# Patient Record
Sex: FEMALE | Race: WHITE | NOT HISPANIC OR LATINO | URBAN - METROPOLITAN AREA
[De-identification: names, ages, dates, MRNs, and addresses within clinical notes are randomized per-mention and may not be internally consistent; named-entity substitution may affect disease eponyms.]

---

## 2020-05-04 ENCOUNTER — EMERGENCY (EMERGENCY)
Facility: HOSPITAL | Age: 20
LOS: 0 days | Discharge: HOME | End: 2020-05-04
Attending: EMERGENCY MEDICINE | Admitting: PEDIATRICS
Payer: COMMERCIAL

## 2020-05-04 VITALS
TEMPERATURE: 98 F | RESPIRATION RATE: 18 BRPM | HEART RATE: 103 BPM | DIASTOLIC BLOOD PRESSURE: 81 MMHG | OXYGEN SATURATION: 100 % | WEIGHT: 152.12 LBS | SYSTOLIC BLOOD PRESSURE: 150 MMHG

## 2020-05-04 DIAGNOSIS — L05.91 PILONIDAL CYST WITHOUT ABSCESS: ICD-10-CM

## 2020-05-04 DIAGNOSIS — K42.9 UMBILICAL HERNIA WITHOUT OBSTRUCTION OR GANGRENE: ICD-10-CM

## 2020-05-04 DIAGNOSIS — R10.9 UNSPECIFIED ABDOMINAL PAIN: ICD-10-CM

## 2020-05-04 LAB
ALBUMIN SERPL ELPH-MCNC: 4.5 G/DL — SIGNIFICANT CHANGE UP (ref 3.5–5.2)
ALP SERPL-CCNC: 32 U/L — SIGNIFICANT CHANGE UP (ref 30–115)
ALT FLD-CCNC: 9 U/L — LOW (ref 14–37)
ANION GAP SERPL CALC-SCNC: 14 MMOL/L — SIGNIFICANT CHANGE UP (ref 7–14)
APPEARANCE UR: CLEAR — SIGNIFICANT CHANGE UP
AST SERPL-CCNC: 19 U/L — SIGNIFICANT CHANGE UP (ref 14–37)
BASOPHILS # BLD AUTO: 0.02 K/UL — SIGNIFICANT CHANGE UP (ref 0–0.2)
BASOPHILS NFR BLD AUTO: 0.2 % — SIGNIFICANT CHANGE UP (ref 0–1)
BILIRUB SERPL-MCNC: 1.4 MG/DL — HIGH (ref 0.2–1.2)
BILIRUB UR-MCNC: NEGATIVE — SIGNIFICANT CHANGE UP
BUN SERPL-MCNC: 10 MG/DL — SIGNIFICANT CHANGE UP (ref 10–20)
CALCIUM SERPL-MCNC: 9.3 MG/DL — SIGNIFICANT CHANGE UP (ref 8.5–10.1)
CHLORIDE SERPL-SCNC: 102 MMOL/L — SIGNIFICANT CHANGE UP (ref 98–110)
CO2 SERPL-SCNC: 23 MMOL/L — SIGNIFICANT CHANGE UP (ref 17–32)
COLOR SPEC: COLORLESS — SIGNIFICANT CHANGE UP
CREAT SERPL-MCNC: 0.7 MG/DL — SIGNIFICANT CHANGE UP (ref 0.7–1.5)
DIFF PNL FLD: NEGATIVE — SIGNIFICANT CHANGE UP
EOSINOPHIL # BLD AUTO: 0.02 K/UL — SIGNIFICANT CHANGE UP (ref 0–0.7)
EOSINOPHIL NFR BLD AUTO: 0.2 % — SIGNIFICANT CHANGE UP (ref 0–8)
GLUCOSE SERPL-MCNC: 92 MG/DL — SIGNIFICANT CHANGE UP (ref 70–99)
GLUCOSE UR QL: NEGATIVE — SIGNIFICANT CHANGE UP
HCG SERPL QL: NEGATIVE — SIGNIFICANT CHANGE UP
HCT VFR BLD CALC: 38.6 % — SIGNIFICANT CHANGE UP (ref 37–47)
HGB BLD-MCNC: 13.7 G/DL — SIGNIFICANT CHANGE UP (ref 12–16)
IMM GRANULOCYTES NFR BLD AUTO: 0.3 % — SIGNIFICANT CHANGE UP (ref 0.1–0.3)
KETONES UR-MCNC: SIGNIFICANT CHANGE UP
LEUKOCYTE ESTERASE UR-ACNC: NEGATIVE — SIGNIFICANT CHANGE UP
LYMPHOCYTES # BLD AUTO: 1.72 K/UL — SIGNIFICANT CHANGE UP (ref 1.2–3.4)
LYMPHOCYTES # BLD AUTO: 18.1 % — LOW (ref 20.5–51.1)
MCHC RBC-ENTMCNC: 31 PG — SIGNIFICANT CHANGE UP (ref 27–31)
MCHC RBC-ENTMCNC: 35.5 G/DL — SIGNIFICANT CHANGE UP (ref 32–37)
MCV RBC AUTO: 87.3 FL — SIGNIFICANT CHANGE UP (ref 81–99)
MONOCYTES # BLD AUTO: 0.46 K/UL — SIGNIFICANT CHANGE UP (ref 0.1–0.6)
MONOCYTES NFR BLD AUTO: 4.8 % — SIGNIFICANT CHANGE UP (ref 1.7–9.3)
NEUTROPHILS # BLD AUTO: 7.26 K/UL — HIGH (ref 1.4–6.5)
NEUTROPHILS NFR BLD AUTO: 76.4 % — HIGH (ref 42.2–75.2)
NITRITE UR-MCNC: NEGATIVE — SIGNIFICANT CHANGE UP
NRBC # BLD: 0 /100 WBCS — SIGNIFICANT CHANGE UP (ref 0–0)
PH UR: 6.5 — SIGNIFICANT CHANGE UP (ref 5–8)
PLATELET # BLD AUTO: 189 K/UL — SIGNIFICANT CHANGE UP (ref 130–400)
POTASSIUM SERPL-MCNC: 4.3 MMOL/L — SIGNIFICANT CHANGE UP (ref 3.5–5)
POTASSIUM SERPL-SCNC: 4.3 MMOL/L — SIGNIFICANT CHANGE UP (ref 3.5–5)
PROT SERPL-MCNC: 7.1 G/DL — SIGNIFICANT CHANGE UP (ref 6–8)
PROT UR-MCNC: NEGATIVE — SIGNIFICANT CHANGE UP
RBC # BLD: 4.42 M/UL — SIGNIFICANT CHANGE UP (ref 4.2–5.4)
RBC # FLD: 11.8 % — SIGNIFICANT CHANGE UP (ref 11.5–14.5)
SODIUM SERPL-SCNC: 139 MMOL/L — SIGNIFICANT CHANGE UP (ref 135–146)
SP GR SPEC: 1.01 — LOW (ref 1.01–1.02)
UROBILINOGEN FLD QL: SIGNIFICANT CHANGE UP
WBC # BLD: 9.51 K/UL — SIGNIFICANT CHANGE UP (ref 4.8–10.8)
WBC # FLD AUTO: 9.51 K/UL — SIGNIFICANT CHANGE UP (ref 4.8–10.8)

## 2020-05-04 PROCEDURE — 99285 EMERGENCY DEPT VISIT HI MDM: CPT

## 2020-05-04 PROCEDURE — 74177 CT ABD & PELVIS W/CONTRAST: CPT | Mod: 26

## 2020-05-04 RX ORDER — SODIUM CHLORIDE 9 MG/ML
3 INJECTION INTRAMUSCULAR; INTRAVENOUS; SUBCUTANEOUS ONCE
Refills: 0 | Status: DISCONTINUED | OUTPATIENT
Start: 2020-05-04 | End: 2020-05-04

## 2020-05-04 RX ORDER — ACETAMINOPHEN 500 MG
650 TABLET ORAL ONCE
Refills: 0 | Status: COMPLETED | OUTPATIENT
Start: 2020-05-04 | End: 2020-05-04

## 2020-05-04 RX ORDER — SODIUM CHLORIDE 9 MG/ML
1000 INJECTION, SOLUTION INTRAVENOUS ONCE
Refills: 0 | Status: COMPLETED | OUTPATIENT
Start: 2020-05-04 | End: 2020-05-04

## 2020-05-04 RX ORDER — IOHEXOL 300 MG/ML
30 INJECTION, SOLUTION INTRAVENOUS ONCE
Refills: 0 | Status: COMPLETED | OUTPATIENT
Start: 2020-05-04 | End: 2020-05-04

## 2020-05-04 RX ADMIN — SODIUM CHLORIDE 1000 MILLILITER(S): 9 INJECTION, SOLUTION INTRAVENOUS at 16:51

## 2020-05-04 RX ADMIN — Medication 650 MILLIGRAM(S): at 16:52

## 2020-05-04 RX ADMIN — IOHEXOL 30 MILLILITER(S): 300 INJECTION, SOLUTION INTRAVENOUS at 16:52

## 2020-05-04 NOTE — ED PROVIDER NOTE - NSFOLLOWUPCLINICS_GEN_ALL_ED_FT
[facility]  Surgery  [address] [place] [address]  Phone: [phone]  Fax:   Follow Up Time:

## 2020-05-04 NOTE — ED PROVIDER NOTE - NS ED ROS FT
Constitutional:  See HPI.   Eyes:  No visual changes, eye pain or discharge.  ENMT:  No hearing changes, pain, discharge or infections. No neck pain or stiffness.  Cardiac:  No chest pain, SOB or edema. No chest pain with exertion.  Respiratory:  No cough or respiratory distress. No hemoptysis.  GI:  + nausea, No vomiting, diarrhea, + RLQ abdominal pain.  :  No dysuria, frequency, hematuria  MS:  No joint pain or back pain.  Neuro:  No LOC. No headache or weakness.    Skin:  No skin rash.  Except as in HPI, all other review of systems is negative

## 2020-05-04 NOTE — ED PROVIDER NOTE - ATTENDING CONTRIBUTION TO CARE
19 yr old female presents to the ED for evaluation of abdominal pain that began 3 days ago.  As per patient, pain began Friday evening, 5/1/20.  She has had no fever, no dysuria, no cough, no congestion, no sore throat.  She vomited once yesterday after eating food she is not normally accustomed to and her stool has been a little softer than usual.  Denies fever. She is sexually active but denies vaginal discharge.  Pain is mostly in RLQ.  Today, a few hours before arrival, she began having purulent discharge from her umbilicus.  She went to an urgent care and was then referred to the ED for evaluation.  Physical Exam: VS reviewed. Pt is well appearing, in no respiratory distress. MMM. Cap refill <2 seconds. No obvious skin rash noted. Chest CTA BL, no wheezing, rales or crackles, good air entry BL.  Normal heart sounds, no murmurs appreciated, no reproducible chest wall pain. Abdomen soft, ND, no guarding, + localized tenderness appreciated to RLQ and periumbilical area.  + purulent discharge noted from umbilicus. Neuro exam grossly intact.  Plan: IV, Labs, Culture of purulent umbilical fluid, PO contrast, CT abdomen with po/iv contrast, urine studies.  Will reassess.

## 2020-05-04 NOTE — CONSULT NOTE ADULT - SUBJECTIVE AND OBJECTIVE BOX
GENERAL SURGERY CONSULT NOTE    Patient: JUAN CARLOS MENDEZ , 19y ([birth year])Female   MRN: [id number]  Location: [facility] ED  Visit: 20 Emergency  Date: 20 @ 23:31    HPI:  19F with no PMH with periumbilical pain started 3 days ago 5/10 in severity no urinary symptoms sharp constant not improved with positioning, + Umbilical drainage purulent, Patient had similar episode couple of years ago with drainage and findings of hair from abscess.     PAST MEDICAL & SURGICAL HISTORY:  No pertinent past medical history  No significant past surgical history    Home Medications:  None    VITALS:  T(F): 97.8 (20 @ 16:18), Max: 97.8 (20 @ 16:18)  HR: 103 (20 @ 16:18) (103 - 103)  BP: 150/81 (20 @ 16:18) (150/81 - 150/81)  RR: 18 (20 @ 16:18) (18 - 18)  SpO2: 100% (20 @ 16:18) (100% - 100%)    PHYSICAL EXAM:  General: NAD  Abdomen: Soft, non-distended, mild supraumbilical tenderness, + purulent drainage from umbilicus. no rebound, no guarding. +BS.    LAB/STUDIES:                        13.7   9.51  )-----------( 189      ( 04 May 2020 17:00 )             38.6     05-    139  |  102  |  10  ----------------------------<  92  4.3   |  23  |  0.7    Ca    9.3      04 May 2020 17:00    TPro  7.1  /  Alb  4.5  /  TBili  1.4<H>  /  DBili  x   /  AST  19  /  ALT  9<L>  /  AlkPhos  32  05-    LIVER FUNCTIONS - ( 04 May 2020 17:00 )  Alb: 4.5 g/dL / Pro: 7.1 g/dL / ALK PHOS: 32 U/L / ALT: 9 U/L / AST: 19 U/L / GGT: x           Urinalysis Basic - ( 04 May 2020 16:35 )    Color: Colorless / Appearance: Clear / S.008 / pH: x  Gluc: x / Ketone: Trace  / Bili: Negative / Urobili: <2 mg/dL   Blood: x / Protein: Negative / Nitrite: Negative   Leuk Esterase: Negative / RBC: x / WBC x   Sq Epi: x / Non Sq Epi: x / Bacteria: x    IMAGING:  CT Abdomen and Pelvis w/ Oral Cont and w/ IV Cont (20 @ 20:21) >  IMPRESSION:   1. No definite evidence of acute intra-abdominal pathology. Normal appendix.  2. Small fat-containing umbilical hernia with adjacent mild stranding of fat; possible chronic change.    ASSESSMENT:  19F with no PMHx with periumbilical pain started 3 days ago 5/10 in severity no urinary symptoms sharp constant not improved with positioning, + Umbilical drainage purulent, Patient had similar episode couple of years ago with drainage and findings of hair from abscess. Physical exam findings, imaging, and labs as documented above.     PLAN:  - Umbilical Pilonidal cyst/abscess spontaneously drainage, opened cavity to facilitate drainage.    - Abx with Augementin 875mg q12h x 5 days.  - Follow up with Dr. Mehta in 2 weeks.    Above plan discussed with Dr. Mehta

## 2020-05-04 NOTE — ED PROVIDER NOTE - PATIENT PORTAL LINK FT
You can access the FollowMyHealth Patient Portal offered by [facility] by registering at the following website: [email or web address]. By joining [facility]’s FollowMyHealth portal, you will also be able to view your health information using other applications (apps) compatible with our system.

## 2020-05-04 NOTE — ED PROVIDER NOTE - PROGRESS NOTE DETAILS
Patient endorsed to Dr. Golden, will follow. [place]: Acknowledged from Dr. Ingram, 20 yo F with 3-4 days of abdominal pain, vomiting x 1 yesterday, today developed of pus from umbilicus. Pain is right sided, but also periumbilical. No fever. Pending CT. MQ: Signed out to me, pending CT abdomen read MQ: CT abdomen shows fat containing umbilical hernia with mild fat stranding, however pus coming from umbilicus, called surgery, will evaluate patient MQ: Surgery believes it is a pilonidal cyst, recommends augmentin x 5 days, and f/u with surgery clinic

## 2020-05-04 NOTE — ED PEDIATRIC NURSE NOTE - OBJECTIVE STATEMENT
Pt. came to ED for sharp pain in lower abdomen  for 3 days and discharge from belly button starting today.  No sob, cough, gi symptoms. No sick contacts.

## 2020-05-04 NOTE — ED PROVIDER NOTE - CLINICAL SUMMARY MEDICAL DECISION MAKING FREE TEXT BOX
18 yo F with 3-4 days of abdominal pain, vomiting x 1 yesterday, today developed of pus from umbilicus. Pain is right sided, but also periumbilical. No fever. Pending CT abdomen. CT revealed small fat containing umbilical hernia with adjacent mild stranding of fat; possible chronic change. Per patient, she had drainage like this once a few years ago but no abdominal pain. Surgery consulted - stated likely pilonidal cyst, since hair was removed from there a few years ago when it was draining then. Surgery stated unrelated to umbilical hernia. Surgery recommended d/c home with Rx for augmentin x 5 days and f/u in surgery clinic. Dx - pilonidal cyst.

## 2020-05-04 NOTE — ED PROVIDER NOTE - NSFOLLOWUPINSTRUCTIONS_ED_ALL_ED_FT
Pilonidal Cyst     A pilonidal cyst is a fluid-filled sac that forms beneath the skin near the tailbone, at the top of the crease of the buttocks (pilonidal area). If the cyst is not large and not infected, it may not cause any problems.  If the cyst becomes irritated or infected, it may get larger and fill with pus. An infected cyst is called an abscess. A pilonidal abscess may cause pain and swelling, and it may need to be drained or removed.  What are the causes?  The cause of this condition is not always known. In some cases, a hair that grows into your skin (ingrown hair) may be the cause.  What increases the risk?  You are more likely to get a pilonidal cyst if you:  Are male.Have lots of hair near the crease of the buttocks.Are overweight.Have a dimple near the crease of the buttocks.Wear tight clothing.Do not bathe or shower often.Sit for long periods of time.What are the signs or symptoms?  Signs and symptoms of a pilonidal cyst may include pain, swelling, redness, and warmth in the pilonidal area. Depending on how big the cyst is, you may be able to feel a lump near your tailbone. If your cyst becomes infected, symptoms may include:  Pus or fluid drainage.Fever.Pain, swelling, and redness getting worse.The lump getting bigger.How is this diagnosed?  This condition may be diagnosed based on:  Your symptoms and medical history.A physical exam.A blood test to check for infection.Testing a pus sample, if applicable.How is this treated?  If your cyst does not cause symptoms, you may not need any treatment. If your cyst bothers you or is infected, you may need a procedure to drain or remove the cyst. Depending on the size, location, and severity of your cyst, your health care provider may:  Make an incision in the cyst and drain it (incision and drainage).Open and drain the cyst, and then stitch the wound so that it stays open while it heals (marsupialization). You will be given instructions about how to care for your open wound while it heals.Remove all or part of the cyst, and then close the wound (cyst removal).You may need to take antibiotic medicines before your procedure.  Follow these instructions at home:  Medicines     Take over-the-counter and prescription medicines only as told by your health care provider.If you were prescribed an antibiotic medicine, take it as told by your health care provider. Do not stop taking the antibiotic even if you start to feel better.General instructions     Keep the area around your pilonidal cyst clean and dry.If there is fluid or pus draining from your cyst:  Cover the area with a clean bandage (dressing) as needed.Wash the area gently with soap and water. Pat the area dry with a clean towel. Do not rub the area because that may cause bleeding.Remove hair from the area around the cyst only if your health care provider tells you to do this.Do not wear tight pants or sit in one position for long periods at a time.Keep all follow-up visits as told by your health care provider. This is important.Contact a health care provider if you have:  New redness, swelling, or pain.A fever.Severe pain.Summary  A pilonidal cyst is a fluid-filled sac that forms beneath the skin near the tailbone, at the top of the crease of the buttocks (pilonidal area).If the cyst becomes irritated or infected, it may get larger and fill with pus. An infected cyst is called an abscess.The cause of this condition is not always known. In some cases, a hair that grows into your skin (ingrown hair) may be the cause.If your cyst does not cause symptoms, you may not need any treatment. If your cyst bothers you or is infected, you may need a procedure to drain or remove the cyst.This information is not intended to replace advice given to you by your health care provider. Make sure you discuss any questions you have with your health care provider.    Please follow up with surgery in 2 to 4 weeks.

## 2020-05-04 NOTE — ED PROVIDER NOTE - OBJECTIVE STATEMENT
19 y.o. F with no PMH with sudden onset RLQ pain started 3 days ago 5/10 in severity no urinary symptoms sharp constant not improved with positioning. No urinary symptoms no fever chills, + Umbilical drainage purulent, No sick ocntacts no SOB, CP, non smoker, + casual drinker. Not sexually active at the moment. No vaginal discharge or bleeding.

## 2020-05-04 NOTE — ED PROVIDER NOTE - PHYSICAL EXAMINATION
CONSTITUTIONAL: Well-developed; well-nourished; in no acute distress.   SKIN: warm, dry  HEAD: Normocephalic; atraumatic.  EYES: PERRL, EOMI, no conjunctival erythema  ENT: No nasal discharge; airway clear.  NECK: Supple; non tender.  CARD: S1, S2 normal; no murmurs, gallops, or rubs. Regular rate and rhythm.   RESP: No wheezes, rales or rhonchi.  ABD: + RLQ tenderness, no rebound no guarding, + Umbilical purulent drainage.  EXT: Normal ROM.  No clubbing, cyanosis or edema.   LYMPH: No acute cervical adenopathy.  NEURO: Alert, oriented, grossly unremarkable  PSYCH: Cooperative, appropriate.

## 2020-05-06 LAB
CULTURE RESULTS: SIGNIFICANT CHANGE UP
SPECIMEN SOURCE: SIGNIFICANT CHANGE UP